# Patient Record
Sex: MALE | Race: OTHER | NOT HISPANIC OR LATINO
[De-identification: names, ages, dates, MRNs, and addresses within clinical notes are randomized per-mention and may not be internally consistent; named-entity substitution may affect disease eponyms.]

---

## 2018-05-07 ENCOUNTER — APPOINTMENT (OUTPATIENT)
Dept: CARDIOTHORACIC SURGERY | Facility: CLINIC | Age: 43
End: 2018-05-07

## 2018-05-07 ENCOUNTER — EMERGENCY (EMERGENCY)
Facility: HOSPITAL | Age: 43
LOS: 1 days | Discharge: HOME | End: 2018-05-07
Attending: EMERGENCY MEDICINE
Payer: COMMERCIAL

## 2018-05-07 VITALS
HEART RATE: 101 BPM | RESPIRATION RATE: 18 BRPM | OXYGEN SATURATION: 98 % | DIASTOLIC BLOOD PRESSURE: 94 MMHG | TEMPERATURE: 98 F | SYSTOLIC BLOOD PRESSURE: 141 MMHG

## 2018-05-07 PROCEDURE — 99284 EMERGENCY DEPT VISIT MOD MDM: CPT | Mod: 25

## 2018-05-07 PROCEDURE — 99283 EMERGENCY DEPT VISIT LOW MDM: CPT

## 2018-05-07 PROCEDURE — 93005 ELECTROCARDIOGRAM TRACING: CPT

## 2018-05-07 PROCEDURE — 93010 ELECTROCARDIOGRAM REPORT: CPT

## 2018-05-07 RX ORDER — EPINEPHRINE 0.3 MG/.3ML
0.3 INJECTION INTRAMUSCULAR; SUBCUTANEOUS
Qty: 2 | Refills: 0 | OUTPATIENT
Start: 2018-05-07

## 2018-05-07 NOTE — ED PROVIDER NOTE - ATTENDING CONTRIBUTION TO CARE
Dr. Jacobo (Attending Physician)  I performed a history and physical exam of the patient and discussed their management with the resident. I reviewed the resident's note and agree with the documented findings and plan of care. My medical decision making and observations are found above.

## 2018-05-07 NOTE — ED PROVIDER NOTE - PROGRESS NOTE DETAILS
EpiPen prescribed for pt, instructions on how to use explained. Info for outpt allergy f/u given. Pt to be discharged and in agreement with plan.

## 2018-05-07 NOTE — ED PROVIDER NOTE - CARE PLAN
Principal Discharge DX:	Allergic reaction to food  Assessment and plan of treatment:	Your symptoms likely stem from an allergic reaction to pineapple. Avoid foods that you know can incite this reaction. There is no evidence that your symptoms are from an underlying heart or lung problem. However, please return to the ED if these symptoms occur again, particularly if there is no inciting substance. Principal Discharge DX:	Allergic reaction to food  Assessment and plan of treatment:	Your symptoms likely stem from an allergic reaction to pineapple. Avoid foods that you know can incite this reaction. There is no evidence that your symptoms are from an underlying heart or lung problem. However, please return to the ED if these symptoms occur again, particularly if there is no inciting substance.  In addition, you were prescribed an EpiPen in case of allergies and explained on how to use it. Due to your numerous allergies, we recommend you follow up with an allergist. The allergy clinic associated with our hospital is at [address] [place] [address], [address], [place], [place], and the telephone number is [phone].

## 2018-05-07 NOTE — ED PROVIDER NOTE - OBJECTIVE STATEMENT
41 yo M with no significant PMH presents with tongue swelling and chest discomfort. He was eating a fruit salad this morning at work and included pineapple, which he rarely eats but has eaten in the past. A few minutes later, his tongue gelt like it was cut and swollen, and 20 minutes later, he developed SOB and chest tightness. He noted palpitations during that time as well. He denied fevers, chills, nausea, vomiting, blurry vision, rhinorrhea, wheezing, or swelling anywhere else.  The tongue symptoms resolved quickly after a Benadryl, but the chest discomfort and SOB resolved after a few hours, spontaneously. 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 He denies these symptoms before. He denies a family history of heart disease, and he is a vegetarian. No weight gain or loss.

## 2018-05-07 NOTE — ED ADULT NURSE NOTE - CHIEF COMPLAINT QUOTE
chest discomfort and sob Became sob after eating pineapple talking took benadryl denies sore throat    1512 EKG seen by dr Zepeda and pt can wait and resident to evaluate pt PT can wait

## 2018-05-07 NOTE — ED PROVIDER NOTE - MEDICAL DECISION MAKING DETAILS
41 yo M with no PMH presents after tongue swelling, SOB/chest tightness after eating pineapple. EKG wnl, patient is completely asymptomatic without any wheezing or signs of distress. No angioedema. Likely allergic reaction triggered by pineapple. No family history or patient history to suggest cardiovascular disease risk, and Well's criteria 1.5 (for tachycardia) which is low risk for PE, therefore no need to check labs or imaging. Reassurance and f/u outpatient. Dr. Jacobo (Attending Physician)  43 yo M with no PMH presents after tongue swelling, SOB/chest tightness after eating pineapple. EKG wnl, patient is completely asymptomatic without any wheezing or signs of distress. No angioedema. Likely allergic reaction triggered by pineapple. No family history or patient history to suggest cardiovascular disease risk, and Well's criteria 1.5 (for tachycardia) which is low risk for PE and his symptoms are now resolved so PE is very unlikely, therefore no need to check labs or imaging. Reassurance and f/u outpatient.

## 2018-05-07 NOTE — ED ADULT NURSE NOTE - OBJECTIVE STATEMENT
Pt presents to the ED with complaint of chest discomfort and shorntess of breath. Pt AXOX3, reporting eating pineapple at desk at work today, felt like "small cuts" were being made on his tiongue and that it was swelling. Pt took benadryl at that time. Pt at that time felt some chest discomfort. Pt reports symptoms have resolved, no chest pain at this time, no shortness of breath, lung sounds clear, breathing unlabored, airway patent no nausea vomiting or diarrhea, no itching rashes or hives.   abdomen soft and nontender.

## 2018-05-07 NOTE — ED ADULT TRIAGE NOTE - CHIEF COMPLAINT QUOTE
chest discomfort and sob Became sob after eating pineapple talking took benadryl denies sorethroat chest discomfort and sob Became sob after eating pineapple talking took benadryl denies sore throat    1512 EKG seen by dr Zepeda and pt can wait and resident to evaluate pt PT can wait

## 2018-05-07 NOTE — ED PROVIDER NOTE - PLAN OF CARE
Your symptoms likely stem from an allergic reaction to pineapple. Avoid foods that you know can incite this reaction. There is no evidence that your symptoms are from an underlying heart or lung problem. However, please return to the ED if these symptoms occur again, particularly if there is no inciting substance. Your symptoms likely stem from an allergic reaction to pineapple. Avoid foods that you know can incite this reaction. There is no evidence that your symptoms are from an underlying heart or lung problem. However, please return to the ED if these symptoms occur again, particularly if there is no inciting substance.  In addition, you were prescribed an EpiPen in case of allergies and explained on how to use it. Due to your numerous allergies, we recommend you follow up with an allergist. The allergy clinic associated with our hospital is at [address] [place] [address], [address], [place], [place], and the telephone number is [phone].

## 2018-05-08 ENCOUNTER — TRANSCRIPTION ENCOUNTER (OUTPATIENT)
Age: 43
End: 2018-05-08

## 2018-05-09 ENCOUNTER — EMERGENCY (EMERGENCY)
Facility: HOSPITAL | Age: 43
LOS: 1 days | Discharge: HOME | End: 2018-05-09
Attending: PERSONAL EMERGENCY RESPONSE ATTENDANT
Payer: COMMERCIAL

## 2018-05-09 VITALS
RESPIRATION RATE: 16 BRPM | OXYGEN SATURATION: 96 % | TEMPERATURE: 98 F | SYSTOLIC BLOOD PRESSURE: 121 MMHG | HEART RATE: 86 BPM | DIASTOLIC BLOOD PRESSURE: 83 MMHG

## 2018-05-09 VITALS
TEMPERATURE: 98 F | DIASTOLIC BLOOD PRESSURE: 69 MMHG | SYSTOLIC BLOOD PRESSURE: 141 MMHG | RESPIRATION RATE: 21 BRPM | HEART RATE: 117 BPM | OXYGEN SATURATION: 97 %

## 2018-05-09 LAB
ALBUMIN SERPL ELPH-MCNC: 4.7 G/DL — SIGNIFICANT CHANGE UP (ref 3.3–5)
ALP SERPL-CCNC: 72 U/L — SIGNIFICANT CHANGE UP (ref 40–120)
ALT FLD-CCNC: 56 U/L — HIGH (ref 10–45)
ANION GAP SERPL CALC-SCNC: 16 MMOL/L — SIGNIFICANT CHANGE UP (ref 5–17)
AST SERPL-CCNC: 35 U/L — SIGNIFICANT CHANGE UP (ref 10–40)
BASOPHILS # BLD AUTO: 0 K/UL — SIGNIFICANT CHANGE UP (ref 0–0.2)
BILIRUB SERPL-MCNC: 0.8 MG/DL — SIGNIFICANT CHANGE UP (ref 0.2–1.2)
BUN SERPL-MCNC: 9 MG/DL — SIGNIFICANT CHANGE UP (ref 7–23)
CALCIUM SERPL-MCNC: 9.6 MG/DL — SIGNIFICANT CHANGE UP (ref 8.4–10.5)
CHLORIDE SERPL-SCNC: 100 MMOL/L — SIGNIFICANT CHANGE UP (ref 96–108)
CO2 SERPL-SCNC: 23 MMOL/L — SIGNIFICANT CHANGE UP (ref 22–31)
CREAT SERPL-MCNC: 0.79 MG/DL — SIGNIFICANT CHANGE UP (ref 0.5–1.3)
D DIMER BLD IA.RAPID-MCNC: <150 NG/ML DDU — SIGNIFICANT CHANGE UP
EOSINOPHIL # BLD AUTO: 0 K/UL — SIGNIFICANT CHANGE UP (ref 0–0.5)
GLUCOSE SERPL-MCNC: 157 MG/DL — HIGH (ref 70–99)
HCT VFR BLD CALC: 38 % — LOW (ref 39–50)
HGB BLD-MCNC: 12.7 G/DL — LOW (ref 13–17)
LYMPHOCYTES # BLD AUTO: 1.4 K/UL — SIGNIFICANT CHANGE UP (ref 1–3.3)
LYMPHOCYTES # BLD AUTO: 8 % — LOW (ref 13–44)
MCHC RBC-ENTMCNC: 21.8 PG — LOW (ref 27–34)
MCHC RBC-ENTMCNC: 33.4 GM/DL — SIGNIFICANT CHANGE UP (ref 32–36)
MCV RBC AUTO: 65.3 FL — LOW (ref 80–100)
MONOCYTES # BLD AUTO: 0.1 K/UL — SIGNIFICANT CHANGE UP (ref 0–0.9)
MONOCYTES NFR BLD AUTO: 2 % — SIGNIFICANT CHANGE UP (ref 2–14)
NEUTROPHILS # BLD AUTO: 9.3 K/UL — HIGH (ref 1.8–7.4)
NEUTROPHILS NFR BLD AUTO: 90 % — HIGH (ref 43–77)
PLATELET # BLD AUTO: 342 K/UL — SIGNIFICANT CHANGE UP (ref 150–400)
POTASSIUM SERPL-MCNC: 3.8 MMOL/L — SIGNIFICANT CHANGE UP (ref 3.5–5.3)
POTASSIUM SERPL-SCNC: 3.8 MMOL/L — SIGNIFICANT CHANGE UP (ref 3.5–5.3)
PROT SERPL-MCNC: 7.8 G/DL — SIGNIFICANT CHANGE UP (ref 6–8.3)
RBC # BLD: 5.81 M/UL — HIGH (ref 4.2–5.8)
RBC # FLD: 13.4 % — SIGNIFICANT CHANGE UP (ref 10.3–14.5)
SODIUM SERPL-SCNC: 139 MMOL/L — SIGNIFICANT CHANGE UP (ref 135–145)
T4 AB SER-ACNC: 6.8 UG/DL — SIGNIFICANT CHANGE UP (ref 4.6–12)
TROPONIN T SERPL-MCNC: <0.01 NG/ML — SIGNIFICANT CHANGE UP (ref 0–0.06)
TSH SERPL-MCNC: 0.86 UIU/ML — SIGNIFICANT CHANGE UP (ref 0.27–4.2)
WBC # BLD: 10.8 K/UL — HIGH (ref 3.8–10.5)
WBC # FLD AUTO: 10.8 K/UL — HIGH (ref 3.8–10.5)

## 2018-05-09 PROCEDURE — 84484 ASSAY OF TROPONIN QUANT: CPT

## 2018-05-09 PROCEDURE — 84436 ASSAY OF TOTAL THYROXINE: CPT

## 2018-05-09 PROCEDURE — 99285 EMERGENCY DEPT VISIT HI MDM: CPT | Mod: 25

## 2018-05-09 PROCEDURE — 85027 COMPLETE CBC AUTOMATED: CPT

## 2018-05-09 PROCEDURE — 71046 X-RAY EXAM CHEST 2 VIEWS: CPT

## 2018-05-09 PROCEDURE — 84443 ASSAY THYROID STIM HORMONE: CPT

## 2018-05-09 PROCEDURE — 96374 THER/PROPH/DIAG INJ IV PUSH: CPT

## 2018-05-09 PROCEDURE — 99284 EMERGENCY DEPT VISIT MOD MDM: CPT | Mod: 25

## 2018-05-09 PROCEDURE — 93010 ELECTROCARDIOGRAM REPORT: CPT

## 2018-05-09 PROCEDURE — 85379 FIBRIN DEGRADATION QUANT: CPT

## 2018-05-09 PROCEDURE — 80053 COMPREHEN METABOLIC PANEL: CPT

## 2018-05-09 PROCEDURE — 71046 X-RAY EXAM CHEST 2 VIEWS: CPT | Mod: 26

## 2018-05-09 PROCEDURE — 93005 ELECTROCARDIOGRAM TRACING: CPT

## 2018-05-09 RX ADMIN — Medication 1 MILLIGRAM(S): at 05:41

## 2018-05-09 NOTE — ED PROVIDER NOTE - ATTENDING CONTRIBUTION TO CARE
Attending MD Baltazar.  Agree with above.  Pt is a 42 yr old male who presents to ED with complaint of chest tightness and SOB that began Monday after eating pineapple and feeling ‘tingling’ on his tongue.  He was seen in ED at that time and txed for presumed allergy.  Pt states that his chest feels persistently tight and he intermittently feels that he cannot catch his breath.  He is currently asymptomatic but states that sxs worsen with exertion and improve with rest.  Sxs last occurred when father passed away in 2014 and he attributed these same sxs to anxiety.  No hx of stress test or echo previously.  + heart disease in grandfather that lead to cardiac arrest and death at 45 yo.  Pt is a lifetime non-smoker.  Heart score is 1 2/2 family hx of heart disease only.  Pt is anxious appearing, mildly tremulious. He endorses sig stress at work and feels increased SOB when speaking re: his sxs.  Breath sounds are clear bilaterally and pt has no hypoxia, no LE edema.  NO sharp CP.  EKG unchanged from Monday.

## 2018-05-09 NOTE — ED PROVIDER NOTE - PLAN OF CARE
Follow up with your medical doctor in 2-3 days for a stress test or call our clinic at [phone] and state you were seen in the Emergency Department and would like to be seen in clinic.   Return for any persistent, worsening symptoms, or ANY concerns at all.

## 2018-05-09 NOTE — ED ADULT NURSE REASSESSMENT NOTE - NS ED NURSE REASSESS COMMENT FT1
Pt AAOx4, NAD, resp nonlabored, resting comfortably in bed with family at bedside. Pt denies CP & SOB at this time. Pt denies headache, dizziness, palpitations, abd pain, n/v/d, urinary symptoms, fevers, chills, weakness at this time. Pt awaiting TSH & T4 results @ this time. Safety maintained.

## 2018-05-09 NOTE — ED PROVIDER NOTE - CARE PLAN
Principal Discharge DX:	Shortness of breath  Assessment and plan of treatment:	Follow up with your medical doctor in 2-3 days for a stress test or call our clinic at [phone] and state you were seen in the Emergency Department and would like to be seen in clinic.   Return for any persistent, worsening symptoms, or ANY concerns at all.

## 2018-05-09 NOTE — ED ADULT NURSE NOTE - OBJECTIVE STATEMENT
41 yo m reporting to ED for chest discomfot. A&Ox4. Pt reporting to ED for 5/10 non-radiating sternal intermittent chest discomfort for x 2 days. No PMH. No familial cardiac history. Pt reports that he had chest pain beginning around 12:00 noon on 5/7/18 after eating pineapple. Pt also reports "I felt like there were cuts in my mouth and some swelling to my lips." Pt was seen at urgent care and was given pepcid 20 mg PO, benadryl 50 mg IV, and prednisone 20 mg PO. Pt was diagnosed with allergic reaction and sent home with epinephrine pen as well as prednisone 20 mg PO & Albuterol sulfate. Pt reports "Since yesterday I feel like I can't catch my breath.". HELMS. Pt also reports to have runny eyes and cough. Pt also reports weakness. Pt appears anxious. A&Ox4. Respirations spontaneous, non-labored. Lungs sounds clear bilaterally. No dizziness, blurred vision,  back pain, n/v, abdominal pain, headache, numbness/tingling, fevers, & chills. Abdomen soft nontender nondistended, strong peripheral pulses x 4, cap refill < 2 seconds, no swelling in the bilateral lower extremities, skin warm and dry. Pt placed on cardiac monitor. EKG completed. 18 gauge established in the RAC. Son-in-law at the bedside. Will continue to reassess.

## 2018-05-09 NOTE — ED PROVIDER NOTE - MEDICAL DECISION MAKING DETAILS
43 yo male with chest pain and sob; likely panic attack however will send troponin and d-dimer to rule out pe and acs; will obtain labs ekg cxr --> re eval

## 2018-05-09 NOTE — ED PROVIDER NOTE - PROGRESS NOTE DETAILS
patients feels much better, does not have any chest pain or shortness of breath and his heart rate is in the 70s, offered cdu for stress however patient would like to follow up with his pcp -johnna Attending MD Baltazar.  Pt feels marked improvement in sxs with ativan.  He has very low heart score and has been having CP for several days.  Troponin, EKG non-actionable.  Given family hx pt offered CDU fro stress test for provocative testing but feels comfortable following up with cardiology as outpt for ongoing testing/management.  Aware that acute change in sxs warrants return to ED.  Stable for discharge.

## 2018-05-11 ENCOUNTER — NON-APPOINTMENT (OUTPATIENT)
Age: 43
End: 2018-05-11

## 2018-05-11 ENCOUNTER — APPOINTMENT (OUTPATIENT)
Dept: PULMONOLOGY | Facility: CLINIC | Age: 43
End: 2018-05-11
Payer: COMMERCIAL

## 2018-05-11 VITALS
WEIGHT: 163 LBS | HEART RATE: 103 BPM | BODY MASS INDEX: 26.2 KG/M2 | SYSTOLIC BLOOD PRESSURE: 130 MMHG | OXYGEN SATURATION: 98 % | HEIGHT: 66 IN | RESPIRATION RATE: 17 BRPM | DIASTOLIC BLOOD PRESSURE: 80 MMHG

## 2018-05-11 DIAGNOSIS — R06.83 SNORING: ICD-10-CM

## 2018-05-11 DIAGNOSIS — T78.40XA ALLERGY, UNSPECIFIED, INITIAL ENCOUNTER: ICD-10-CM

## 2018-05-11 DIAGNOSIS — E66.3 OVERWEIGHT: ICD-10-CM

## 2018-05-11 DIAGNOSIS — K21.9 GASTRO-ESOPHAGEAL REFLUX DISEASE W/OUT ESOPHAGITIS: ICD-10-CM

## 2018-05-11 DIAGNOSIS — Z83.79 FAMILY HISTORY OF OTHER DISEASES OF THE DIGESTIVE SYSTEM: ICD-10-CM

## 2018-05-11 DIAGNOSIS — J30.9 ALLERGIC RHINITIS, UNSPECIFIED: ICD-10-CM

## 2018-05-11 DIAGNOSIS — D64.9 ANEMIA, UNSPECIFIED: ICD-10-CM

## 2018-05-11 DIAGNOSIS — D17.30 BENIGN LIPOMATOUS NEOPLASM OF SKIN AND SUBCUTANEOUS TISSUE OF UNSPECIFIED SITES: ICD-10-CM

## 2018-05-11 DIAGNOSIS — Z82.5 FAMILY HISTORY OF ASTHMA AND OTHER CHRONIC LOWER RESPIRATORY DISEASES: ICD-10-CM

## 2018-05-11 DIAGNOSIS — Z83.2 FAMILY HISTORY OF DISEASES OF THE BLOOD AND BLOOD-FORMING ORGANS AND CERTAIN DISORDERS INVOLVING THE IMMUNE MECHANISM: ICD-10-CM

## 2018-05-11 DIAGNOSIS — R06.02 SHORTNESS OF BREATH: ICD-10-CM

## 2018-05-11 PROCEDURE — 99204 OFFICE O/P NEW MOD 45 MIN: CPT

## 2018-05-11 RX ORDER — RANITIDINE HYDROCHLORIDE 300 MG/1
300 CAPSULE ORAL
Qty: 1 | Refills: 1 | Status: ACTIVE | COMMUNITY
Start: 2018-05-11 | End: 1900-01-01

## 2018-05-11 RX ORDER — OLOPATADINE HYDROCHLORIDE 2 MG/ML
0.2 SOLUTION OPHTHALMIC
Qty: 3 | Refills: 1 | Status: ACTIVE | COMMUNITY
Start: 2018-05-11 | End: 1900-01-01

## 2018-05-11 RX ORDER — LORAZEPAM 0.5 MG/1
0.5 TABLET ORAL
Qty: 20 | Refills: 0 | Status: ACTIVE | COMMUNITY
Start: 2018-05-09

## 2018-05-11 RX ORDER — EPINEPHRINE 0.3 MG/.3ML
0.3 INJECTION INTRAMUSCULAR
Qty: 2 | Refills: 0 | Status: ACTIVE | COMMUNITY
Start: 2018-05-07

## 2018-05-11 RX ORDER — DESLORATADINE 5 MG/1
5 TABLET ORAL DAILY
Qty: 90 | Refills: 1 | Status: ACTIVE | COMMUNITY
Start: 2018-05-11 | End: 1900-01-01

## 2018-05-11 RX ORDER — FLUTICASONE FUROATE AND VILANTEROL TRIFENATATE 200; 25 UG/1; UG/1
200-25 POWDER RESPIRATORY (INHALATION) DAILY
Qty: 3 | Refills: 1 | Status: ACTIVE | COMMUNITY
Start: 2018-05-11 | End: 1900-01-01

## 2018-05-11 RX ORDER — PREDNISONE 20 MG/1
20 TABLET ORAL
Qty: 10 | Refills: 0 | Status: ACTIVE | COMMUNITY
Start: 2018-05-08

## 2018-05-11 RX ORDER — OLOPATADINE HYDROCHLORIDE 665 UG/1
0.6 SPRAY, METERED NASAL
Qty: 3 | Refills: 1 | Status: ACTIVE | COMMUNITY
Start: 2018-05-11 | End: 1900-01-01

## 2018-05-11 RX ORDER — ALBUTEROL SULFATE 90 UG/1
108 (90 BASE) AEROSOL, METERED RESPIRATORY (INHALATION)
Qty: 18 | Refills: 0 | Status: ACTIVE | COMMUNITY
Start: 2018-05-08

## 2018-05-15 ENCOUNTER — LABORATORY RESULT (OUTPATIENT)
Age: 43
End: 2018-05-15

## 2018-05-16 LAB
24R-OH-CALCIDIOL SERPL-MCNC: 62.2 PG/ML
25(OH)D3 SERPL-MCNC: 8.9 NG/ML
BASOPHILS # BLD AUTO: 0 K/UL
BASOPHILS NFR BLD AUTO: 0 %
EOSINOPHIL # BLD AUTO: 0.08 K/UL
EOSINOPHIL NFR BLD AUTO: 0.8 %
HCT VFR BLD CALC: 38.9 %
HGB BLD-MCNC: 12.3 G/DL
IGE SER-MCNC: 40 IU/ML
LYMPHOCYTES # BLD AUTO: 4.56 K/UL
LYMPHOCYTES NFR BLD AUTO: 45 %
MAN DIFF?: NORMAL
MCHC RBC-ENTMCNC: 20.9 PG
MCHC RBC-ENTMCNC: 31.6 GM/DL
MCV RBC AUTO: 66 FL
MONOCYTES # BLD AUTO: 0.43 K/UL
MONOCYTES NFR BLD AUTO: 4.2 %
NEUTROPHILS # BLD AUTO: 5.07 K/UL
NEUTROPHILS NFR BLD AUTO: 50 %
PLATELET # BLD AUTO: 399 K/UL
RBC # BLD: 5.89 M/UL
RBC # FLD: 15 %
WBC # FLD AUTO: 10.14 K/UL

## 2018-05-17 LAB
A ALTERNATA IGE QN: <0.1 KUA/L
A FUMIGATUS IGE QN: <0.1 KUA/L
C ALBICANS IGE QN: <0.1 KUA/L
C HERBARUM IGE QN: <0.1 KUA/L
CAT DANDER IGE QN: <0.1 KUA/L
CLAM IGE QN: <0.1 KUA/L
CODFISH IGE QN: <0.1 KUA/L
COMMON RAGWEED IGE QN: <0.1 KUA/L
CORN IGE QN: <0.1 KUA/L
COW MILK IGE QN: <0.1 KUA/L
D FARINAE IGE QN: <0.1 KUA/L
D PTERONYSS IGE QN: <0.1 KUA/L
DEPRECATED A ALTERNATA IGE RAST QL: 0
DEPRECATED A FUMIGATUS IGE RAST QL: 0
DEPRECATED C ALBICANS IGE RAST QL: 0
DEPRECATED C HERBARUM IGE RAST QL: 0
DEPRECATED CAT DANDER IGE RAST QL: 0
DEPRECATED CLAM IGE RAST QL: 0
DEPRECATED CODFISH IGE RAST QL: 0
DEPRECATED COMMON RAGWEED IGE RAST QL: 0
DEPRECATED CORN IGE RAST QL: 0
DEPRECATED COW MILK IGE RAST QL: 0
DEPRECATED D FARINAE IGE RAST QL: 0
DEPRECATED D PTERONYSS IGE RAST QL: 0
DEPRECATED DOG DANDER IGE RAST QL: 0
DEPRECATED EGG WHITE IGE RAST QL: 0
DEPRECATED M RACEMOSUS IGE RAST QL: 0
DEPRECATED PEANUT IGE RAST QL: 0
DEPRECATED PINEAPPLE IGE RAST QL: 0
DEPRECATED ROACH IGE RAST QL: 0
DEPRECATED SCALLOP IGE RAST QL: <0.1 KUA/L
DEPRECATED SESAME SEED IGE RAST QL: 0
DEPRECATED SHRIMP IGE RAST QL: 0
DEPRECATED SOYBEAN IGE RAST QL: 0
DEPRECATED TIMOTHY IGE RAST QL: 0
DEPRECATED WALNUT IGE RAST QL: 0
DEPRECATED WHEAT IGE RAST QL: 0
DEPRECATED WHITE OAK IGE RAST QL: 0
DOG DANDER IGE QN: <0.1 KUA/L
EGG WHITE IGE QN: <0.1 KUA/L
M RACEMOSUS IGE QN: <0.1 KUA/L
PEANUT IGE QN: <0.1 KUA/L
PINEAPPLE IGE QN: <0.1 KUA/L
ROACH IGE QN: <0.1 KUA/L
SCALLOP IGE QN: 0
SCALLOP IGE QN: <0.1 KUA/L
SESAME SEED IGE QN: <0.1 KUA/L
SOYBEAN IGE QN: <0.1 KUA/L
TIMOTHY IGE QN: <0.1 KUA/L
WALNUT IGE QN: <0.1 KUA/L
WHEAT IGE QN: <0.1 KUA/L
WHITE OAK IGE QN: <0.1 KUA/L

## 2018-05-24 DIAGNOSIS — E55.9 VITAMIN D DEFICIENCY, UNSPECIFIED: ICD-10-CM

## 2018-07-18 ENCOUNTER — APPOINTMENT (OUTPATIENT)
Dept: PULMONOLOGY | Facility: CLINIC | Age: 43
End: 2018-07-18

## 2018-09-03 ENCOUNTER — RX RENEWAL (OUTPATIENT)
Age: 43
End: 2018-09-03

## 2018-09-03 RX ORDER — ERGOCALCIFEROL 1.25 MG/1
1.25 MG CAPSULE, LIQUID FILLED ORAL
Qty: 12 | Refills: 1 | Status: ACTIVE | COMMUNITY
Start: 2018-05-24 | End: 1900-01-01

## 2019-10-20 ENCOUNTER — TRANSCRIPTION ENCOUNTER (OUTPATIENT)
Age: 44
End: 2019-10-20

## 2020-04-25 ENCOUNTER — MESSAGE (OUTPATIENT)
Age: 45
End: 2020-04-25

## 2020-08-03 NOTE — ED PROVIDER NOTE - CROS ED CARDIOVAS ALL NEG
- - -
no blurred vision/no change in level of consciousness/no weakness/no fever/no confusion/no loss of consciousness/no numbness

## 2021-07-08 NOTE — ED PROVIDER NOTE - CROS ED ALLERGIC IMMUN ALL NEG

## 2021-07-12 ENCOUNTER — TRANSCRIPTION ENCOUNTER (OUTPATIENT)
Age: 46
End: 2021-07-12

## 2023-02-20 ENCOUNTER — EMERGENCY (EMERGENCY)
Facility: HOSPITAL | Age: 48
LOS: 1 days | Discharge: HOME | End: 2023-02-20
Attending: STUDENT IN AN ORGANIZED HEALTH CARE EDUCATION/TRAINING PROGRAM
Payer: COMMERCIAL

## 2023-02-20 VITALS
HEART RATE: 98 BPM | WEIGHT: 160.06 LBS | DIASTOLIC BLOOD PRESSURE: 89 MMHG | RESPIRATION RATE: 18 BRPM | HEIGHT: 66 IN | TEMPERATURE: 98 F | SYSTOLIC BLOOD PRESSURE: 142 MMHG

## 2023-02-20 VITALS
HEART RATE: 86 BPM | RESPIRATION RATE: 16 BRPM | DIASTOLIC BLOOD PRESSURE: 85 MMHG | OXYGEN SATURATION: 96 % | SYSTOLIC BLOOD PRESSURE: 122 MMHG | TEMPERATURE: 98 F

## 2023-02-20 PROCEDURE — 99283 EMERGENCY DEPT VISIT LOW MDM: CPT

## 2023-02-20 PROCEDURE — 99284 EMERGENCY DEPT VISIT MOD MDM: CPT

## 2023-02-20 RX ORDER — IBUPROFEN 200 MG
600 TABLET ORAL ONCE
Refills: 0 | Status: COMPLETED | OUTPATIENT
Start: 2023-02-20 | End: 2023-02-20

## 2023-02-20 RX ORDER — DIAZEPAM 5 MG
5 TABLET ORAL ONCE
Refills: 0 | Status: DISCONTINUED | OUTPATIENT
Start: 2023-02-20 | End: 2023-02-20

## 2023-02-20 RX ORDER — ACETAMINOPHEN 500 MG
975 TABLET ORAL ONCE
Refills: 0 | Status: COMPLETED | OUTPATIENT
Start: 2023-02-20 | End: 2023-02-20

## 2023-02-20 RX ORDER — DIAZEPAM 5 MG
1 TABLET ORAL
Qty: 9 | Refills: 0
Start: 2023-02-20 | End: 2023-02-22

## 2023-02-20 RX ADMIN — Medication 600 MILLIGRAM(S): at 03:59

## 2023-02-20 RX ADMIN — Medication 5 MILLIGRAM(S): at 03:59

## 2023-02-20 RX ADMIN — Medication 975 MILLIGRAM(S): at 03:59

## 2023-02-20 NOTE — ED PROVIDER NOTE - NSFOLLOWUPINSTRUCTIONS_ED_ALL_ED_FT
[facility]  Orthopedic Surgery  [address] [place] [address]  Phone: [phone]  Fax:   Follow Up Time:     [facility]  Orthopedic Surgery  [address], 3rd & 4th [place] [address]  Phone: [phone]  Fax:   Follow Up Time:     [facility] [place]  Orthopedics  .  [place]   Phone: [phone]  Fax:   Follow Up Time:     [facility]  Orthopedics  [address] [place] [address]  Phone: [phone]  Fax: [phone]  Follow Up Time:     [facility]  Orthopedics  [address] [place] [address]  Phone: [phone]  Fax:   Follow Up Time:     Back Pain  WHAT YOU NEED TO KNOW:  Back pain is common. It can be caused by many conditions, such as arthritis or the breakdown of spinal discs. Your risk for back pain is increased by injuries, lack of activity, or repeated bending and twisting. You may feel sore or stiff on one or both sides of your back. The pain may spread to your buttocks or thighs.  DISCHARGE INSTRUCTIONS:  Return to the emergency department if:   •You have pain, numbness, or weakness in one or both legs.  •Your pain becomes so severe that you cannot walk.  •You cannot control your urine or bowel movements.  •You have severe back pain with chest pain.  •You have severe back pain, nausea, and vomiting.  •You have severe back pain that spreads to your side or genital area.  Contact your healthcare provider if:   •You have back pain that does not get better with rest and pain medicine.  •You have a fever.  •You have pain that worsens when you are on your back or when you rest.  •You have pain that worsens when you cough or sneeze.  •You lose weight without trying.  •You have questions or concerns about your condition or care.  Medicines:   •NSAIDs help decrease swelling and pain. This medicine is available with or without a doctor's order. NSAIDs can cause stomach bleeding or kidney problems in certain people. If you take blood thinner medicine, always ask your healthcare provider if NSAIDs are safe for you. Always read the medicine label and follow directions.  •Acetaminophen decreases pain and fever. It is available without a doctor's order. Ask how much to take and how often to take it. Follow directions. Read the labels of all other medicines you are using to see if they also contain acetaminophen, or ask your doctor or pharmacist. Acetaminophen can cause liver damage if not taken correctly. Do not use more than 4 grams (4,000 milligrams) total of acetaminophen in one day.   •Muscle relaxers help decrease muscle spasms and back pain.  •Prescription pain medicine may be given. Ask your healthcare provider how to take this medicine safely. Some prescription pain medicines contain acetaminophen. Do not take other medicines that contain acetaminophen without talking to your healthcare provider. Too much acetaminophen may cause liver damage. Prescription pain medicine may cause constipation. Ask your healthcare provider how to prevent or treat constipation.   •Take your medicine as directed. Contact your healthcare provider if you think your medicine is not helping or if you have side effects. Tell him or her if you are allergic to any medicine. Keep a list of the medicines, vitamins, and herbs you take. Include the amounts, and when and why you take them. Bring the list or the pill bottles to follow-up visits. Carry your medicine list with you in case of an emergency.  How to manage your back pain:   •Apply ice on your back for 15 to 20 minutes every hour or as directed. Use an ice pack, or put crushed ice in a plastic bag. Cover it with a towel before you apply it to your skin. Ice helps prevent tissue damage and decreases pain.  •Apply heat on your back for 20 to 30 minutes every 2 hours for as many days as directed. Heat helps decrease pain and muscle spasms.  •Stay active as much as you can without causing more pain. Bed rest could make your back pain worse. Avoid heavy lifting until your pain is gone.  •Go to physical therapy as directed. A physical therapist can teach you exercises to help improve movement and strength, and to decrease pain.  Follow up with your healthcare provider in 2 weeks, or as directed: Write down your questions so you remember to ask them during your visits.

## 2023-02-20 NOTE — ED PROVIDER NOTE - PHYSICAL EXAMINATION
Attending/MD Ethan.  VITALS: reviewed  GEN: No apparent distress, A & O x 4  HEAD/EYES: NC/AT, anicteric sclerae, no conjunctival pallor  ENT: mucus membranes moist, neck is supple  RESP: lungs CTA with equal breath sounds bilaterally, chest wall nontender and atraumatic  CV: heart with reg rhythm S1, S2, no murmur; distal pulses intact and symmetric bilaterally  ABDOMEN: normoactive bowel sounds, soft, nondistended, nontender  SKIN: warm, dry, no rash, no bruising, no cyanosis.  NEURO: alert, mentating appropriately, no facial asymmetry.  PSYCH: Affect appropriate  MSK/Neuro:  the neck has no midline tenderness, deformity, or stepoff, and is ranged painlessly. extremities atraumatic and nontender, no edema, no asymmetry. the back is with midline tenderness, there is no spinal deformity or stepoff and the back is ranged but limited due to the pain.  Right: (-)SLR, (-)cross SLR. muscle strenght 5/5 in quadriceps, 5/5 in knee extension, 5/5 in knee extension, 5/5 dorse flaxion, 5/5 plantar flexion, DTR +1 knee and +1 achilless. Sensation L1-5, S1 intact.   Left: Right: (-)SLR, (-)cross SLR. muscle strenght 5/5 in quadriceps, 5/5 in knee extension, 5/5 in knee extension, 5/5 dorse flaxion, 5/5 plantar flexion, DTR +1 knee and +1 achilless. Sensation L1-5, S1 intact.

## 2023-02-20 NOTE — ED PROVIDER NOTE - OBJECTIVE STATEMENT
Attending/MD Ethan. 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 fever,  no IVDU

## 2023-02-20 NOTE — ED PROVIDER NOTE - CARE PROVIDER_API CALL
Primitivo Colby (MD)  Family Medicine  [address], [address] [place] [address]  Phone: [phone]  Fax: [phone]  Established Patient  Follow Up Time: 1-3 Days

## 2023-02-20 NOTE — ED PROVIDER NOTE - PATIENT PORTAL LINK FT
You can access the FollowMyHealth Patient Portal offered by [facility] by registering at the following website: [email or web address]. By joining [employer]’s FollowMyHealth portal, you will also be able to view your health information using other applications (apps) compatible with our system.

## 2023-02-20 NOTE — ED ADULT NURSE NOTE - OBJECTIVE STATEMENT
47yM, no PMH, A&Ox4, independent, presents to the ED c/o lower back pain x1 week. Pain began last sunday, worsening over the past few days. Pt states he cannot bend his back anymore. Denies any neuro sx, maintaining continence of stool/urine, able to ambulate maintaining strength in legs. Gross neuro intact, no difficulty speaking in complete sentences, pulses x 4, moving all extremities, abdomen soft nontender nondistended, skin intact. Pt denies fevers/chills, numbness/tingling, weakness, headache, dizziness, vision changes, cp, sob, cough, abd pain, n/v/d, dysuria, hematuria, bloody stools.

## 2023-02-20 NOTE — ED PROVIDER NOTE - DIFFERENTIAL DIAGNOSIS
includes but not limtied to strain, contusion, sciatica, disc slip, no clinical evidence or cauda equina or risk for epidural abscess Differential Diagnosis

## 2023-02-20 NOTE — ED ADULT NURSE NOTE - NSIMPLEMENTINTERV_GEN_ALL_ED
Implemented All Universal Safety Interventions:  [place] to call system. Call bell, personal items and telephone within reach. Instruct patient to call for assistance. Room bathroom lighting operational. Non-slip footwear when patient is off stretcher. Physically safe environment: no spills, clutter or unnecessary equipment. Stretcher in lowest position, wheels locked, appropriate side rails in place.

## 2023-02-20 NOTE — ED PROVIDER NOTE - CLINICAL SUMMARY MEDICAL DECISION MAKING FREE TEXT BOX
47M h/o Allergic Rhinitis, GERD p/w subacute LBP. No alarming neuro deficits or weakness that might otherwise be c/f spinal cord pathologies. Primarily paraspinal & positional - c/f muscular pathology.   - pain control with APAP, Ibuprofen, Valium x1  - Likely TBDC if improves

## 2023-02-20 NOTE — ED PROVIDER NOTE - ATTENDING CONTRIBUTION TO CARE
I have personally seen and examined this patient.  I have fully participated in the care of this patient. I performed a substantive portion of the visit including all aspects of the medical decision making. I have reviewed all pertinent clinical information, including history, physical exam, plan and the Resident’s note and agree except as noted. - MD Ethan.    see dispo charts

## 2024-03-13 NOTE — ED PROVIDER NOTE - NS ED MD DISPO DISCHARGE
Goal Outcome Evaluation:  Plan of Care Reviewed With: patient           Outcome Evaluation: Physical therapy evaluation complete.  Patient performs supine to sit with modified independence and sit to/from stand with supervision.  Patient performs gait x75 feet with rolling walker, supervision.  Patient agreeable to use of walker with encouragement.  Patient reports current mobility is at baseline and expresses no mobility concerns regarding return home.  Would recommend patient continue to use rolling walker for improved safety with mobility.  No further PT needs in acute care setting.      Anticipated Discharge Disposition (PT): home                         Home

## 2025-07-10 NOTE — ED ADULT NURSE NOTE - EXTENSIONS OF SELF_ADULT
Depth In Mm (Location #2): 1 Length Of Topical Anesthesia Application (Optional): 20 minutes How Many Cc Of Bacteriostatic 0.9% Saline Were Added?: 0 Depth In Mm (Location #6): 0.1 Additional Info: With Prp. Gave sample of Hydrinity Depth In Mm (Location #1): 1.8 Consent: Written consent obtained, risks reviewed including but not limited to pain, scarring, infection and incomplete improvement.  Patient understands the procedure is cosmetic in nature and will require out of pocket payment. Price (Use Numbers Only, No Special Characters Or $): 350 Location #2: forehead and nose Location #1: cheeks, around mouth None Topical Anesthesia?: 10% benzocaine, 3% lidocaine, 2% tetracaine, 0.01% phenylephrine Post-Care Instructions: After the procedure, take precautions agains sun exposure. Do not apply sunscreen for 12 hours after the procedure. Do not apply make-up for 12 hours after the procedure. Avoid alcohol based toners for 10-14 days. After 2-3 days patients can return to their regular skin regimen. Detail Level: Zone